# Patient Record
Sex: MALE | Employment: FULL TIME | URBAN - METROPOLITAN AREA
[De-identification: names, ages, dates, MRNs, and addresses within clinical notes are randomized per-mention and may not be internally consistent; named-entity substitution may affect disease eponyms.]

---

## 2018-03-05 ENCOUNTER — HOSPITAL ENCOUNTER (OUTPATIENT)
Dept: LAB | Age: 28
Discharge: HOME | End: 2018-03-05
Payer: COMMERCIAL

## 2018-03-05 ENCOUNTER — OFFICE VISIT (OUTPATIENT)
Dept: HEMATOLOGY | Age: 28
End: 2018-03-05

## 2018-03-05 VITALS
RESPIRATION RATE: 14 BRPM | BODY MASS INDEX: 33.72 KG/M2 | TEMPERATURE: 99.4 F | OXYGEN SATURATION: 98 % | SYSTOLIC BLOOD PRESSURE: 124 MMHG | HEART RATE: 77 BPM | WEIGHT: 249 LBS | DIASTOLIC BLOOD PRESSURE: 88 MMHG | HEIGHT: 72 IN

## 2018-03-05 DIAGNOSIS — R73.03 PREDIABETES: ICD-10-CM

## 2018-03-05 DIAGNOSIS — R74.8 ELEVATED LIVER ENZYMES: ICD-10-CM

## 2018-03-05 DIAGNOSIS — R74.8 ELEVATED LIVER ENZYMES: Primary | ICD-10-CM

## 2018-03-05 PROBLEM — K76.0 FATTY LIVER: Status: ACTIVE | Noted: 2018-03-05

## 2018-03-05 LAB
ALBUMIN SERPL-MCNC: 4.1 G/DL (ref 3.4–5)
ALBUMIN/GLOB SERPL: 1.2 {RATIO} (ref 0.8–1.7)
ALP SERPL-CCNC: 47 U/L (ref 45–117)
ALT SERPL-CCNC: 47 U/L (ref 16–61)
ANION GAP SERPL CALC-SCNC: 8 MMOL/L (ref 3–18)
AST SERPL-CCNC: 104 U/L (ref 15–37)
BASOPHILS # BLD: 0 K/UL (ref 0–0.06)
BASOPHILS NFR BLD: 0 % (ref 0–2)
BILIRUB DIRECT SERPL-MCNC: 0.1 MG/DL (ref 0–0.2)
BILIRUB SERPL-MCNC: 0.6 MG/DL (ref 0.2–1)
BUN SERPL-MCNC: 9 MG/DL (ref 7–18)
BUN/CREAT SERPL: 10 (ref 12–20)
CALCIUM SERPL-MCNC: 8.9 MG/DL (ref 8.5–10.1)
CHLORIDE SERPL-SCNC: 106 MMOL/L (ref 100–108)
CHOLEST SERPL-MCNC: 159 MG/DL
CO2 SERPL-SCNC: 29 MMOL/L (ref 21–32)
CREAT SERPL-MCNC: 0.92 MG/DL (ref 0.6–1.3)
DIFFERENTIAL METHOD BLD: NORMAL
EOSINOPHIL # BLD: 0.1 K/UL (ref 0–0.4)
EOSINOPHIL NFR BLD: 2 % (ref 0–5)
ERYTHROCYTE [DISTWIDTH] IN BLOOD BY AUTOMATED COUNT: 13.4 % (ref 11.6–14.5)
EST. AVERAGE GLUCOSE BLD GHB EST-MCNC: 126 MG/DL
FERRITIN SERPL-MCNC: 129 NG/ML (ref 8–388)
GLOBULIN SER CALC-MCNC: 3.4 G/DL (ref 2–4)
GLUCOSE SERPL-MCNC: 92 MG/DL (ref 74–99)
HBA1C MFR BLD: 6 % (ref 4.5–5.6)
HCT VFR BLD AUTO: 43.3 % (ref 36–48)
HDLC SERPL-MCNC: 49 MG/DL (ref 40–60)
HDLC SERPL: 3.2 {RATIO} (ref 0–5)
HGB BLD-MCNC: 14.6 G/DL (ref 13–16)
INR PPP: 1 (ref 0.8–1.2)
IRON SATN MFR SERPL: 23 %
IRON SERPL-MCNC: 75 UG/DL (ref 50–175)
LDLC SERPL CALC-MCNC: 95.4 MG/DL (ref 0–100)
LIPID PROFILE,FLP: NORMAL
LYMPHOCYTES # BLD: 2.1 K/UL (ref 0.9–3.6)
LYMPHOCYTES NFR BLD: 27 % (ref 21–52)
MCH RBC QN AUTO: 27.4 PG (ref 24–34)
MCHC RBC AUTO-ENTMCNC: 33.7 G/DL (ref 31–37)
MCV RBC AUTO: 81.4 FL (ref 74–97)
MONOCYTES # BLD: 0.8 K/UL (ref 0.05–1.2)
MONOCYTES NFR BLD: 10 % (ref 3–10)
NEUTS SEG # BLD: 4.8 K/UL (ref 1.8–8)
NEUTS SEG NFR BLD: 61 % (ref 40–73)
PLATELET # BLD AUTO: 291 K/UL (ref 135–420)
PMV BLD AUTO: 11.8 FL (ref 9.2–11.8)
POTASSIUM SERPL-SCNC: 4.3 MMOL/L (ref 3.5–5.5)
PROT SERPL-MCNC: 7.5 G/DL (ref 6.4–8.2)
PROTHROMBIN TIME: 12.4 SEC (ref 11.5–15.2)
RBC # BLD AUTO: 5.32 M/UL (ref 4.7–5.5)
SODIUM SERPL-SCNC: 143 MMOL/L (ref 136–145)
TIBC SERPL-MCNC: 322 UG/DL (ref 250–450)
TRIGL SERPL-MCNC: 73 MG/DL (ref ?–150)
VLDLC SERPL CALC-MCNC: 14.6 MG/DL
WBC # BLD AUTO: 7.9 K/UL (ref 4.6–13.2)

## 2018-03-05 PROCEDURE — 87340 HEPATITIS B SURFACE AG IA: CPT | Performed by: INTERNAL MEDICINE

## 2018-03-05 PROCEDURE — 86803 HEPATITIS C AB TEST: CPT | Performed by: INTERNAL MEDICINE

## 2018-03-05 PROCEDURE — 86708 HEPATITIS A ANTIBODY: CPT | Performed by: INTERNAL MEDICINE

## 2018-03-05 PROCEDURE — 86704 HEP B CORE ANTIBODY TOTAL: CPT | Performed by: INTERNAL MEDICINE

## 2018-03-05 PROCEDURE — 82390 ASSAY OF CERULOPLASMIN: CPT | Performed by: INTERNAL MEDICINE

## 2018-03-05 PROCEDURE — 36415 COLL VENOUS BLD VENIPUNCTURE: CPT | Performed by: INTERNAL MEDICINE

## 2018-03-05 PROCEDURE — 85025 COMPLETE CBC W/AUTO DIFF WBC: CPT | Performed by: INTERNAL MEDICINE

## 2018-03-05 PROCEDURE — 83036 HEMOGLOBIN GLYCOSYLATED A1C: CPT | Performed by: INTERNAL MEDICINE

## 2018-03-05 PROCEDURE — 83540 ASSAY OF IRON: CPT | Performed by: INTERNAL MEDICINE

## 2018-03-05 PROCEDURE — 83516 IMMUNOASSAY NONANTIBODY: CPT | Performed by: INTERNAL MEDICINE

## 2018-03-05 PROCEDURE — 80048 BASIC METABOLIC PNL TOTAL CA: CPT | Performed by: INTERNAL MEDICINE

## 2018-03-05 PROCEDURE — 86038 ANTINUCLEAR ANTIBODIES: CPT | Performed by: INTERNAL MEDICINE

## 2018-03-05 PROCEDURE — 82728 ASSAY OF FERRITIN: CPT | Performed by: INTERNAL MEDICINE

## 2018-03-05 PROCEDURE — 82103 ALPHA-1-ANTITRYPSIN TOTAL: CPT | Performed by: INTERNAL MEDICINE

## 2018-03-05 PROCEDURE — 86706 HEP B SURFACE ANTIBODY: CPT | Performed by: INTERNAL MEDICINE

## 2018-03-05 PROCEDURE — 80076 HEPATIC FUNCTION PANEL: CPT | Performed by: INTERNAL MEDICINE

## 2018-03-05 PROCEDURE — 80061 LIPID PANEL: CPT | Performed by: INTERNAL MEDICINE

## 2018-03-05 PROCEDURE — 85610 PROTHROMBIN TIME: CPT | Performed by: INTERNAL MEDICINE

## 2018-03-05 NOTE — MR AVS SNAPSHOT
Radames Macias 
 
 
 [place] [address] 
[phone] Patient: Rachel Harris MRN: [id number] IND:9/[id number] Visit Information Date & Time Provider Department Dept. Phone Encounter #  
 3/5/2018  9:30 AM MD Travis Barclay[address]  [address] [id number] Follow-up Instructions Return in about 4 weeks (around 4/2/2018) for Erica. Upcoming Health Maintenance Date Due DTaP/Tdap/Td series (1 - Tdap) 9/10/2011 Influenza Age 5 to Adult 8/1/2017 Allergies as of 3/5/2018  Review Complete On: 3/5/2018 By: Rosy Colorado MD  
 No Known Allergies Current Immunizations  Never Reviewed No immunizations on file. Not reviewed this visit You Were Diagnosed With   
  
 Codes Comments Elevated liver enzymes    -  Primary ICD-10-CM: R74.8 ICD-9-CM: 790.5 Vitals BP Pulse Temp Resp Height(growth percentile) 124/88 (BP 1 Location: Left arm, BP Patient Position: Sitting) 77 99.4 °F (37.4 °C) (Tympanic) 14 6' (1.829 m) Weight(growth percentile) SpO2 BMI Smoking Status 249 lb (112.9 kg) 98% 33.77 kg/m2 Never Smoker BMI and BSA Data Body Mass Index Body Surface Area  
 33.77 kg/m 2 2.39 m 2 Your Updated Medication List  
  
Notice  As of 3/5/2018 10:12 AM  
 You have not been prescribed any medications. Follow-up Instructions Return in about 4 weeks (around 4/2/2018) for Erica. To-Do List   
 03/05/2018 Lab:  ACTIN (SMOOTH MUSCLE) ANTIBODY   
  
 03/05/2018 Lab:  ALPHA-1-ANTITRYPSIN, TOTAL   
  
 03/05/2018 Lab:  ANTINUCLEAR ANTIBODIES, IFA   
  
 03/05/2018 Lab:  CBC WITH AUTOMATED DIFF   
  
 03/05/2018 Lab:  CERULOPLASMIN   
  
 03/05/2018 Lab:  FERRITIN   
  
 03/05/2018 Lab:  HCV AB W/REFLEX VERIFICATION   
  
 03/05/2018 Lab:  HEMOGLOBIN A1C WITH EAG   
  
 03/05/2018 Lab:  HEP A AB, TOTAL   
  
 03/05/2018 Lab:  HEP B SURFACE AB   
  
 03/05/2018 Lab:  HEP B SURFACE AG   
  
 03/05/2018 Lab:  HEPATIC FUNCTION PANEL   
  
 03/05/2018 Lab:  HEPATITIS B CORE AB, TOTAL   
  
 03/05/2018 Lab:  IRON PROFILE   
  
 03/05/2018 Lab:  LIPID PANEL   
  
 03/05/2018 Lab:  METABOLIC PANEL, BASIC   
  
 03/05/2018 Lab:  PROTHROMBIN TIME + INR   
  
 03/05/2018 Imaging:  US ABD LTD W ELASTOGRAPHY Introducing [facility] & HEALTH SERVICES! Kristy San introduces [employer] patient portal. Now you can access parts of your medical record, email your doctor's office, and request medication refills online. 1. In your internet browser, go to [email or web address] 2. Click on the First Time User? Click Here link in the Sign In box. You will see the New Member Sign Up page. 3. Enter your [employer] Access Code exactly as it appears below. You will not need to use this code after youve completed the sign-up process. If you do not sign up before the expiration date, you must request a new code. · [employer] Access Code: [email or web address] Expires: 6/3/2018 10:12 AM 
 
4. Enter the last four digits of your Social Security Number (xxxx) and Date of Birth (mm/dd/yyyy) as indicated and click Submit. You will be taken to the next sign-up page. 5. Create a [employer] ID. This will be your [employer] login ID and cannot be changed, so think of one that is secure and easy to remember. 6. Create a [employer] password. You can change your password at any time. 7. Enter your Password Reset Question and Answer. This can be used at a later time if you forget your password. 8. Enter your e-mail address. You will receive e-mail notification when new information is available in [address] 9. Click Sign Up. You can now view and download portions of your medical record. 10. Click the Download Summary menu link to download a portable copy of your medical information. If you have questions, please visit the Frequently Asked Questions section of the [employer] website. Remember, Docin is NOT to be used for urgent needs. For medical emergencies, dial 911. Now available from your iPhone and Android! Please provide this summary of care documentation to your next provider. Your primary care clinician is listed as Bora Mayo. If you have any questions after today's visit, please call [phone].

## 2018-03-05 NOTE — PROGRESS NOTES
70 Thelma Bradley MD, [address], [place], [place]       CATIA Negrete PA-C Laymond Lurie, [facility]   CATIA Lopez NP [address] [facility] 136    at [address], [address] [place]  22.    [phone]    FAX: [address] [place] [address]    at [address], [address] [facility], [address] [phone]    FAX: [phone]         Patient Care Team:  Lisa Singh MD as PCP - General (Family Practice)      Problem List  Date Reviewed: 3/5/2018          Codes Class Noted    Fatty liver ICD-10-CM: K76.0  ICD-9-CM: 571.8  3/5/2018        Prediabetes ICD-10-CM: R73.03  ICD-9-CM: 790.29  3/5/2018    Overview Signed 3/5/2018 11:12 AM by Justin Núñez MD     HBA1C 6%                     The physicians listed above have asked me to see Katelin Andrew in consultation regarding elevated liver enzymes and its management. All medical records sent by the referring physicians were reviewed including imaging studies     The patient is a 32 y.o. Black male who was first noted to have abnormalities in liver enzymes in 9/2017. Serologic evaluation for markers of chronic liver disease were either not performed or available to me. Ultrasound of the liver was performed in 10/2017. The results of the imaging suggested fatty liver disease. An assessment of liver fibrosis with biopsy or elastography has not been performed. The patient had not started any new medications within 3 months preceeding the elevation in liver chemistries. The most recent laboratory studies are not available. The patient has no symptoms which could be attributed to the liver disorder.       The patient completes all daily activities without any functional limitations. The patient has not experienced fatigue, pain in the right side over the liver,       ALLERGIES  No Known Allergies    MEDICATIONS  No current outpatient prescriptions on file. No current facility-administered medications for this visit. SYSTEM REVIEW NOT RELATED TO LIVER DISEASE OR REVIEWED ABOVE:  Constitution systems: 44 pound weight loss from diet and exercise. Eyes: Negative for visual changes. ENT: Negative for sore throat, painful swallowing. Respiratory: Negative for cough, hemoptysis, SOB. Cardiology: Negative for chest pain, palpitations. GI:  Negative for constipation or diarrhea. : Negative for urinary frequency, dysuria, hematuria, nocturia. Skin: Negative for rash. Hematology: Negative for easy bruising, blood clots. Musculo-skelatal: Negative for back pain, muscle pain, weakness. Neurologic: Negative for headaches, dizziness, vertigo, memory problems not related to HE. Psychology: Negative for anxiety, depression. FAMILY HISTORY:  The father is alive and healthy. The mother has the following chronic diseases: HTN, DM. There is no family history of liver disease. SOCIAL HISTORY:  The patient has never been . The patient has no children. The patient has never used tobacco products. The patient has never consumed significant amounts of alcohol. 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 PHYSICAL EXAMINATION:  Visit Vitals    /88 (BP 1 Location: Left arm, BP Patient Position: Sitting)    Pulse 77    Temp 99.4 °F (37.4 °C) (Tympanic)    Resp 14    Ht 6' (1.829 m)    Wt 249 lb (112.9 kg)    SpO2 98%    BMI 33.77 kg/m2     General: No acute distress. Eyes: Sclera anicteric. ENT: No oral lesions. Thyroid normal.  Nodes: No adenopathy. Skin: No spider angiomata. No jaundice. No palmar erythema.   Respiratory: Lungs clear to auscultation. Cardiovascular: Regular heart rate. No murmurs. No JVD. Abdomen: Soft non-tender. Liver size normal to percussion/palpation. Spleen not palpable. No obvious ascites. Extremities: No edema. No muscle wasting. No gross arthritic changes. Neurologic: Alert and oriented. Cranial nerves grossly intact. No asterixis. LABORATORY STUDIES:  Recent liver function panel, CBC with platelet count and BMP are not available. These studies will be performed. SEROLOGIES:  Not available or performed. Testing was performed today. LIVER HISTOLOGY:  Not available or performed    ENDOSCOPIC PROCEDURES:  Not available or performed    RADIOLOGY:  10/2017. Ultrasound of liver. Echogenic consistent with fatty liver. No liver mass lesions. No dilated bile ducts. No ascites. OTHER TESTING:  Not available or performed    ASSESSMENT AND PLAN:  Elevation in liver transaminases of unclear etiology at this time. Will perform laboratory testing to monitor liver function and degree of liver injury. This included BMP, hepatic panel, CBC with platelet count, INR. Serologic testing to help define the cause of the laboratory abnormality were ordered. The need to assess liver fibrosis was discussed. Sheer wave elastography can assess liver fibrosis and determine if a patient has advanced fibrosis or cirrhosis without the need for liver biopsy. Sheer wave elastography is now available at [address]. This will be scheduled. If elastrography suggests advanced fibrosis then a liver biopsy should be considered. The patient has no overt features of metabolic syndrome and may therefore not have fatty liver. If he does it may be benign NAFL. The patient has galo on a reduced charb diet and working out more. He has lost 44 pounds in the past few months. The patient was directed to continue all current medications at the current dosages.   There are no contraindications for the patient to take any medications that are necessary for treatment of other medical issues. The patient was counseled regarding alcohol consumption. The need for vaccination against viral hepatitis A and B will be assessed with serologic and instituted as appropriate. All of the above issues were discussed with the patient. All questions were answered. The patient expressed a clear understanding of the above. [facility] [place] [address] in 4 weeks to review all data and determine the treatment plan.     Deion Whitney MD  Liver [place] of [address] [place] [address], [address] [place] [address], [facility] [address]  [phone]

## 2018-03-06 LAB
A1AT SERPL-MCNC: 97 MG/DL (ref 90–200)
ACTIN IGG SERPL-ACNC: 16 UNITS (ref 0–19)
ANA TITR SER IF: NEGATIVE {TITER}
CERULOPLASMIN SERPL-MCNC: 21.7 MG/DL (ref 16–31)
COMMENT, 144067: NORMAL
HAV AB SER QL IA: POSITIVE
HBV CORE AB SERPL QL IA: NEGATIVE
HBV SURFACE AB SER QL IA: POSITIVE
HBV SURFACE AB SERPL IA-ACNC: >1000 MIU/ML
HBV SURFACE AG SER QL: <0.1 INDEX
HBV SURFACE AG SER QL: NEGATIVE
HCV AB S/CO SERPL IA: <0.1 S/CO RATIO (ref 0–0.9)
HEP BS AB COMMENT,HBSAC: NORMAL

## 2018-03-14 ENCOUNTER — HOSPITAL ENCOUNTER (OUTPATIENT)
Dept: ULTRASOUND IMAGING | Age: 28
Discharge: HOME | End: 2018-03-14
Attending: INTERNAL MEDICINE
Payer: COMMERCIAL

## 2018-03-14 DIAGNOSIS — R74.8 ELEVATED LIVER ENZYMES: ICD-10-CM

## 2018-03-14 PROCEDURE — 0346T US ABD LTD W ELASTOGRAPHY: CPT

## 2018-04-09 ENCOUNTER — OFFICE VISIT (OUTPATIENT)
Dept: HEMATOLOGY | Age: 28
End: 2018-04-09

## 2018-04-09 VITALS
RESPIRATION RATE: 18 BRPM | DIASTOLIC BLOOD PRESSURE: 91 MMHG | WEIGHT: 250 LBS | OXYGEN SATURATION: 95 % | HEIGHT: 72 IN | BODY MASS INDEX: 33.86 KG/M2 | SYSTOLIC BLOOD PRESSURE: 144 MMHG | HEART RATE: 100 BPM | TEMPERATURE: 100.1 F

## 2018-04-09 DIAGNOSIS — K76.0 FATTY LIVER: Primary | ICD-10-CM

## 2018-04-09 NOTE — MR AVS SNAPSHOT
[address] [place] [address] 
[phone] Patient: Rai Hu MRN: [id number] ENN:7/[birth year] Visit Information Date & Time Provider Department Dept. Phone Encounter #  
 4/9/2018  3:30 PM CATIA Stormtuan [address] of  [address] [id number] Follow-up Instructions Return in about 6 months (around 10/9/2018) for Erica . Upcoming Health Maintenance Date Due DTaP/Tdap/Td series (1 - Tdap) 9/10/2011 Influenza Age 5 to Adult 8/1/2017 Allergies as of 4/9/2018  Review Complete On: 4/9/2018 By: Gokul Prado No Known Allergies Current Immunizations  Never Reviewed No immunizations on file. Not reviewed this visit Vitals BP Pulse Temp Resp Height(growth percentile) (!) 144/91 (BP 1 Location: Right arm, BP Patient Position: Sitting) 100 100.1 °F (37.8 °C) (Tympanic) 18 6' (1.829 m) Weight(growth percentile) SpO2 BMI Smoking Status 250 lb (113.4 kg) 95% 33.91 kg/m2 Never Smoker BMI and BSA Data Body Mass Index Body Surface Area  
 33.91 kg/m 2 2.4 m 2 Your Updated Medication List  
  
Notice  As of 4/9/2018  3:52 PM  
 You have not been prescribed any medications. Follow-up Instructions Return in about 6 months (around 10/9/2018) for Erica . Introducing [facility] & HEALTH SERVICES! Dear Marcus Lowry: Thank you for requesting a [employer] account. Our records indicate that you already have an active [employer]keysee account. You can access your account anytime at [email or web address] Did you know that you can access your hospital and ER discharge instructions at any time in [employer]see? You can also review all of your test results from your hospital stay or ER visit. Additional Information If you have questions, please visit the Frequently Asked Questions section of the [employer] website at [email or web address]. [email or web address] Remember, [employer] is NOT to be used for urgent needs. For medical emergencies, dial 911. Now available from your iPhone and Android! Please provide this summary of care documentation to your next provider. Your primary care clinician is listed as Patrizia Suh. If you have any questions after today's visit, please call [phone].

## 2018-04-09 NOTE — PROGRESS NOTES
Janis Alejo is a 32 y.o. male    No chief complaint on file. 1. Have you been to the ER, urgent care clinic or hospitalized since your last visit? NO.     2. Have you seen or consulted any other health care providers outside of the [facility] since your last visit (Include any pap smears or colon screening)?  NO    Learning Assessment 3/5/2018   PRIMARY LEARNER Patient   HIGHEST LEVEL OF EDUCATION - PRIMARY LEARNER  > 4 YEARS OF COLLEGE   BARRIERS PRIMARY LEARNER NONE   CO-LEARNER CAREGIVER No   PRIMARY LANGUAGE ENGLISH   LEARNER PREFERENCE PRIMARY LISTENING   ANSWERED BY patient   RELATIONSHIP SELF

## 2018-04-09 NOTE — PROGRESS NOTES
70 Thelma Bradley MD, [address], [facility] [place]       Ayush Gonzales, RAPHAEL Mcdowell, [facility]-BC   Lacinda Boast, NP Dickson Dan, NP [address] [facility] [facility] 136    at [address], [address] [place] [address]  22.    [phone]    FAX: [address] [place] [address]    at [facility], [id number] [place] [address], [address]    [phone]    FAX: [phone]       Patient Care Team:  Horacio Ervin MD as PCP - General (Family Practice)      Problem List  Date Reviewed: 3/5/2018          Codes Class Noted    Fatty liver ICD-10-CM: K76.0  ICD-9-CM: 571.8  3/5/2018        Prediabetes ICD-10-CM: R73.03  ICD-9-CM: 790.29  3/5/2018    Overview Signed 3/5/2018 11:12 AM by Blayne Riley MD     HBA1C 6%                   [facility] Pale [facility] for management of presumed non-alcoholic fatty liver (NAFL). The active problem list, all pertinent past medical history, medications, liver histology, radiologic findings, and laboratory findings related to the liver disorder were reviewed with the patient. The patient is a 32 y.o. Black male who was first noted to have abnormalities in liver enzymes in 9/2017. Serologic evaluation for markers of chronic liver disease were negative. Ultrasound of the liver was performed in 10/2017. The results of the imaging suggested fatty liver disease. Assessment of liver fibrosis was performed with sheer wave elastography at [facility] in 3/2018. The result was 3.6 kPa which correlates with no fibrosis. The patient had not started any new medications within 3 months preceeding the elevation in liver chemistries.     The most recent laboratory studies indicate that the AST is elevated, ALT is normal, alkaline phosphatase is normal, tests of hepatic synthetic and metabolic function are normal, and the platelet count is normal.      The patient has no symptoms which could be attributed to the liver disorder. The patient completes all daily activities without any functional limitations. The patient has not experienced fatigue, pain in the right side over the liver. ALLERGIES  No Known Allergies    MEDICATIONS  No current outpatient prescriptions on file. No current facility-administered medications for this visit. SYSTEM REVIEW NOT RELATED TO LIVER DISEASE OR REVIEWED ABOVE:  Constitution systems: 20 pound weight loss from diet and exercise. Eyes: Negative for visual changes. ENT: Negative for sore throat, painful swallowing. Respiratory: Negative for cough, hemoptysis, SOB. Cardiology: Negative for chest pain, palpitations. GI:  Negative for constipation or diarrhea. : Negative for urinary frequency, dysuria, hematuria, nocturia. Skin: Negative for rash. Hematology: Negative for easy bruising, blood clots. Musculo-skelatal: Negative for back pain, muscle pain, weakness. Neurologic: Negative for headaches, dizziness, vertigo, memory problems not related to HE. Psychology: Negative for anxiety, depression. FAMILY HISTORY:  The father is alive and healthy. The mother has the following chronic diseases: HTN, DM. There is no family history of liver disease. SOCIAL HISTORY:  The patient has never been . The patient has no children. The patient has never used tobacco products. The patient has never consumed significant amounts of alcohol. 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     PHYSICAL EXAMINATION:  Visit Vitals    BP (!) 144/91 (BP 1 Location: Right arm, BP Patient Position: Sitting)    Pulse 100    Temp 100.1 °F (37.8 °C) (Tympanic)    Resp 18    Ht 6' (1.829 m)    Wt 250 lb (113.4 kg)    SpO2 95%    BMI 33.91 kg/m2     General: No acute distress. Eyes: Sclera anicteric. ENT: No oral lesions. Thyroid normal.  Nodes: No adenopathy. Skin: No spider angiomata. No jaundice. No palmar erythema. Respiratory: Lungs clear to auscultation. Cardiovascular: Regular heart rate. No murmurs. No JVD. Abdomen: Soft non-tender. Liver size normal to percussion/palpation. Spleen not palpable. No obvious ascites. Extremities: No edema. No muscle wasting. No gross arthritic changes. Neurologic: Alert and oriented. Cranial nerves grossly intact. No asterixis. LABORATORY STUDIES:  Liver [place] of [address] [place] [address] Units 3/5/2018   WBC 4.6 - 13.2 K/uL 7.9   ANC 1.8 - 8.0 K/UL 4.8   HGB 13.0 - 16.0 g/dL 14.6    - 420 K/uL 291   INR 0.8 - 1.2   1.0   AST 15 - 37 U/L 104 (H)   ALT 16 - 61 U/L 47   Alk Phos 45 - 117 U/L 47   Bili, Total 0.2 - 1.0 MG/DL 0.6   Bili, Direct 0.0 - 0.2 MG/DL 0.1   Albumin 3.4 - 5.0 g/dL 4.1   BUN 7.0 - 18 MG/DL 9   Creat 0.6 - 1.3 MG/DL 0.92   Na 136 - 145 mmol/L 143   K 3.5 - 5.5 mmol/L 4.3   Cl 100 - 108 mmol/L 106   CO2 21 - 32 mmol/L 29   Glucose 74 - 99 mg/dL 92     SEROLOGIES:  Serologies Latest Ref Rng & Units 3/5/2018   Hep A Ab, Total NEGATIVE   Positive (A)   Hep B Surface Ag <1.00 Index <0.10   Hep B Surface Ag Interp NEG   NEGATIVE   Hep B Core Ab, Total NEGATIVE   NEGATIVE   Hep B Surface Ab >10.0 mIU/mL >1000.00   Hep B Surface Ab Interp POS   POSITIVE   Hep C Ab 0.0 - 0.9 s/co ratio <0.1   Ferritin 8 - 388 NG/   Iron % Saturation % 23   DEJAN, IFA  NEGATIVE   ASMCA 0 - 19 Units 16   Ceruloplasmin 16.0 - 31.0 mg/dL 21.7   Alpha-1 antitrypsin level 90 - 200 mg/dL 97     LIVER HISTOLOGY:  3/2018. Sheer wave elastography performed at THE [facility]. EkPa was E Range: 2.2-4.6 kPa, E Mean: 3.6 kPa, E Median: 3.6 kPa, E Std: 0.7 kPa. The results suggested a fibrosis level of F0.     ENDOSCOPIC PROCEDURES:  Not available or performed    RADIOLOGY:  10/2017. Ultrasound of liver. Echogenic consistent with fatty liver. No liver mass lesions. No dilated bile ducts. No ascites. 3/2018. Ultrasound of liver. Echogenic consistent with fatty liver. No liver mass lesions. No dilated bile ducts. No ascites. OTHER TESTING:  Not available or performed    ASSESSMENT AND PLAN:  Elevation in liver transaminases of unclear etiology at this time. Suspected fatty liver disease with no fibrosis. AST is elevated, ALT is normal, Alkaline phosphate is normal. Liver function is normal. The platelet count is normal.     Serologic testing to help define the cause of the laboratory abnormality were negative. Based upon laboratory studies and imaging the patient does not appear to have advanced liver disease. The benign form of fatty liver disease, NAFL (nonalcoholic fatty liver), is not thought to progress to fibrosis or cirrhosis. If the patient loses weight all steatosis will resolve. However, if the patient gains weight he could evolve into HAHN, develop liver injury and be at risk to develop cirrhosis. The patient has no overt features of metabolic syndrome and may therefore not have fatty liver. If he does it may be benign NAFL. The patient has been on a reduced carb diet and working out more. He has lost 20 pounds. We will continue to monitor the patient at periodic intervals. If weight loss is successful the fat will resolve from the liver and liver enzymes should return to the normal range. We would then repeat an ultrasound to see if this also returns to normal.  If liver enzymes do not return to normal with weight reduction additional evaluation may be necessary over time. There are no contraindications for the patient to take any medications that are necessary for treatment of other medical issues. Currently the patient is not taking any medications.      The patient was counseled regarding alcohol consumption. Vaccination for viral hepatitis A and B is not required. The patient has serologic evidence of prior exposure or vaccination with immunity. All of the above issues were discussed with the patient. All questions were answered. The patient expressed a clear understanding of the above. [address] [place] [address] in 6 months to review all data and determine the treatment plan.     CATIA Kunz [address] [place] [address] - [address]  [phone]

## 2018-10-10 ENCOUNTER — OFFICE VISIT (OUTPATIENT)
Dept: HEMATOLOGY | Age: 28
End: 2018-10-10

## 2018-10-10 ENCOUNTER — HOSPITAL ENCOUNTER (OUTPATIENT)
Dept: LAB | Age: 28
Discharge: HOME | End: 2018-10-10
Payer: COMMERCIAL

## 2018-10-10 VITALS
OXYGEN SATURATION: 97 % | TEMPERATURE: 98.2 F | RESPIRATION RATE: 18 BRPM | DIASTOLIC BLOOD PRESSURE: 81 MMHG | SYSTOLIC BLOOD PRESSURE: 128 MMHG | HEIGHT: 72 IN | HEART RATE: 79 BPM | WEIGHT: 253 LBS | BODY MASS INDEX: 34.27 KG/M2

## 2018-10-10 DIAGNOSIS — R74.8 ELEVATED LIVER ENZYMES: Primary | ICD-10-CM

## 2018-10-10 DIAGNOSIS — R74.8 ELEVATED LIVER ENZYMES: ICD-10-CM

## 2018-10-10 LAB
ALBUMIN SERPL-MCNC: 4.1 G/DL (ref 3.4–5)
ALBUMIN/GLOB SERPL: 1.2 {RATIO} (ref 0.8–1.7)
ALP SERPL-CCNC: 44 U/L (ref 45–117)
ALT SERPL-CCNC: 58 U/L (ref 16–61)
ANION GAP SERPL CALC-SCNC: 9 MMOL/L (ref 3–18)
AST SERPL-CCNC: 30 U/L (ref 15–37)
BASOPHILS # BLD: 0.1 K/UL (ref 0–0.1)
BASOPHILS NFR BLD: 2 % (ref 0–2)
BILIRUB DIRECT SERPL-MCNC: 0.1 MG/DL (ref 0–0.2)
BILIRUB SERPL-MCNC: 0.5 MG/DL (ref 0.2–1)
BUN SERPL-MCNC: 8 MG/DL (ref 7–18)
BUN/CREAT SERPL: 8 (ref 12–20)
CALCIUM SERPL-MCNC: 9 MG/DL (ref 8.5–10.1)
CHLORIDE SERPL-SCNC: 102 MMOL/L (ref 100–108)
CO2 SERPL-SCNC: 28 MMOL/L (ref 21–32)
CREAT SERPL-MCNC: 0.95 MG/DL (ref 0.6–1.3)
DIFFERENTIAL METHOD BLD: ABNORMAL
EOSINOPHIL # BLD: 0.1 K/UL (ref 0–0.4)
EOSINOPHIL NFR BLD: 2 % (ref 0–5)
ERYTHROCYTE [DISTWIDTH] IN BLOOD BY AUTOMATED COUNT: 13.5 % (ref 11.6–14.5)
GLOBULIN SER CALC-MCNC: 3.4 G/DL (ref 2–4)
GLUCOSE SERPL-MCNC: 82 MG/DL (ref 74–99)
HCT VFR BLD AUTO: 46 % (ref 36–48)
HGB BLD-MCNC: 15.1 G/DL (ref 13–16)
LYMPHOCYTES # BLD: 1.9 K/UL (ref 0.9–3.6)
LYMPHOCYTES NFR BLD: 31 % (ref 21–52)
MCH RBC QN AUTO: 26.7 PG (ref 24–34)
MCHC RBC AUTO-ENTMCNC: 32.8 G/DL (ref 31–37)
MCV RBC AUTO: 81.3 FL (ref 74–97)
MONOCYTES # BLD: 0.5 K/UL (ref 0.05–1.2)
MONOCYTES NFR BLD: 8 % (ref 3–10)
NEUTS SEG # BLD: 3.5 K/UL (ref 1.8–8)
NEUTS SEG NFR BLD: 57 % (ref 40–73)
PLATELET # BLD AUTO: 287 K/UL (ref 135–420)
PMV BLD AUTO: 11.9 FL (ref 9.2–11.8)
POTASSIUM SERPL-SCNC: 4.9 MMOL/L (ref 3.5–5.5)
PROT SERPL-MCNC: 7.5 G/DL (ref 6.4–8.2)
RBC # BLD AUTO: 5.66 M/UL (ref 4.7–5.5)
SODIUM SERPL-SCNC: 139 MMOL/L (ref 136–145)
WBC # BLD AUTO: 6.1 K/UL (ref 4.6–13.2)

## 2018-10-10 PROCEDURE — 80048 BASIC METABOLIC PNL TOTAL CA: CPT | Performed by: NURSE PRACTITIONER

## 2018-10-10 PROCEDURE — 80076 HEPATIC FUNCTION PANEL: CPT | Performed by: NURSE PRACTITIONER

## 2018-10-10 PROCEDURE — 36415 COLL VENOUS BLD VENIPUNCTURE: CPT | Performed by: NURSE PRACTITIONER

## 2018-10-10 PROCEDURE — 85025 COMPLETE CBC W/AUTO DIFF WBC: CPT | Performed by: NURSE PRACTITIONER

## 2018-10-10 NOTE — PROGRESS NOTES
1. Have you been to the ER, urgent care clinic since your last visit? Hospitalized since your last visit? No    2. Have you seen or consulted any other health care providers outside of the [address] [place] [address] since your last visit? Include any pap smears or colon screening.  No

## 2018-10-10 NOTE — PROGRESS NOTES
245 [facility] 2014 Ric Cisse MD, Balaji Tadeo, Lizzie Grady [place], [place]       Faustina Patient, NP    Arely Childress, RAPHAEL Britton, [facility]   Burton Riggs, CATIA Sandhu [facility] 136    at [facility], [address] [address]  [address].    [phone]    FAX: [address] [place] [address], [address] [facility], [address]    [phone]    FAX: [phone]     Patient Care Team:  Lloyd Rosales MD as PCP - General (Family Practice)      Problem List  Date Reviewed: 4/10/2018          Codes Class Noted    Fatty liver ICD-10-CM: K76.0  ICD-9-CM: 571.8  3/5/2018        Prediabetes ICD-10-CM: R73.03  ICD-9-CM: 790.29  3/5/2018    Overview Signed 3/5/2018 11:12 AM by Monalisa Akhtar MD     HBA1C 6%                   Marcia Angeles returns to the [address] for management of presumed non-alcoholic fatty liver (NAFL). The active problem list, all pertinent past medical history, medications, liver histology, radiologic findings, and laboratory findings related to the liver disorder were reviewed with the patient. The patient is a 29 y.o. Black male who was first noted to have abnormalities in liver enzymes in 9/2017. Serologic evaluation for markers of chronic liver disease were negative. Ultrasound of the liver was performed in 10/2017. The results of the imaging suggested fatty liver disease. Assessment of liver fibrosis was performed with sheer wave elastography at THE [facility] in 3/2018. The result was 3.6 kPa which correlates with no fibrosis. The patient had not started any new medications within 3 months preceeding the elevation in liver chemistries.     The most recent laboratory studies indicate that the AST is elevated, ALT is normal, alkaline phosphatase is normal, tests of hepatic synthetic and metabolic function are normal, and the platelet count is normal.      The patient has no symptoms which could be attributed to the liver disorder. The patient has not experienced fatigue, pain in the right side over the liver. The patient completes all daily activities without any functional limitations. ALLERGIES  No Known Allergies    MEDICATIONS  No current outpatient prescriptions on file. No current facility-administered medications for this visit. SYSTEM REVIEW NOT RELATED TO LIVER DISEASE OR REVIEWED ABOVE:  Constitution systems: 20 pound weight loss from diet and exercise. Eyes: Negative for visual changes. ENT: Negative for sore throat, painful swallowing. Respiratory: Negative for cough, hemoptysis, SOB. Cardiology: Negative for chest pain, palpitations. GI:  Negative for constipation or diarrhea. : Negative for urinary frequency, dysuria, hematuria, nocturia. Skin: Negative for rash. Hematology: Negative for easy bruising, blood clots. Musculo-skelatal: Negative for back pain, muscle pain, weakness. Neurologic: Negative for headaches, dizziness, vertigo, memory problems not related to HE. Psychology: Negative for anxiety, depression. FAMILY HISTORY:  The father is alive and healthy. The mother has the following chronic diseases: HTN, DM. There is no family history of liver disease. SOCIAL HISTORY:  The patient has never been . The patient has no children. The patient has never used tobacco products. The patient has never consumed significant amounts of alcohol. The patient currently works full time as a contractor with the [employer]     PHYSICAL EXAMINATION:  Visit Vitals    /81 (BP 1 Location: Left arm, BP Patient Position: Sitting)    Pulse 79    Temp 98.2 °F (36.8 °C) (Tympanic)    Resp 18    Ht 6' (1.829 m)    Wt 253 lb (114.8 kg)    SpO2 97%    BMI 34.31 kg/m2     General: No acute distress. Eyes: Sclera anicteric. ENT: No oral lesions. Thyroid normal.  Nodes: No adenopathy. Skin: No spider angiomata. No jaundice. No palmar erythema. Respiratory: Lungs clear to auscultation. Cardiovascular: Regular heart rate. No murmurs. No JVD. Abdomen: Soft non-tender. Liver size normal to percussion/palpation. Spleen not palpable. No obvious ascites. Extremities: No edema. No muscle wasting. No gross arthritic changes. Neurologic: Alert and oriented. Cranial nerves grossly intact. No asterixis. LABORATORY STUDIES:  Liver [place] of [address] Units 3/5/2018   WBC 4.6 - 13.2 K/uL 7.9   ANC 1.8 - 8.0 K/UL 4.8   HGB 13.0 - 16.0 g/dL 14.6    - 420 K/uL 291   INR 0.8 - 1.2   1.0   AST 15 - 37 U/L 104 (H)   ALT 16 - 61 U/L 47   Alk Phos 45 - 117 U/L 47   Bili, Total 0.2 - 1.0 MG/DL 0.6   Bili, Direct 0.0 - 0.2 MG/DL 0.1   Albumin 3.4 - 5.0 g/dL 4.1   BUN 7.0 - 18 MG/DL 9   Creat 0.6 - 1.3 MG/DL 0.92   Na 136 - 145 mmol/L 143   K 3.5 - 5.5 mmol/L 4.3   Cl 100 - 108 mmol/L 106   CO2 21 - 32 mmol/L 29   Glucose 74 - 99 mg/dL 92     SEROLOGIES:  Serologies Latest Ref Rng & Units 3/5/2018   Hep A Ab, Total NEGATIVE   Positive (A)   Hep B Surface Ag <1.00 Index <0.10   Hep B Surface Ag Interp NEG   NEGATIVE   Hep B Core Ab, Total NEGATIVE   NEGATIVE   Hep B Surface Ab >10.0 mIU/mL >1000.00   Hep B Surface Ab Interp POS   POSITIVE   Hep C Ab 0.0 - 0.9 s/co ratio <0.1   Ferritin 8 - 388 NG/   Iron % Saturation % 23   DEJAN, IFA  NEGATIVE   ASMCA 0 - 19 Units 16   Ceruloplasmin 16.0 - 31.0 mg/dL 21.7   Alpha-1 antitrypsin level 90 - 200 mg/dL 97     LIVER HISTOLOGY:  3/2018. Sheer wave elastography performed at THE [facility]. EkPa was E Range: 2.2-4.6 kPa, E Mean: 3.6 kPa, E Median: 3.6 kPa, E Std: 0.7 kPa. The results suggested a fibrosis level of F0.     ENDOSCOPIC PROCEDURES:  Not available or performed    RADIOLOGY:  10/2017. Ultrasound of liver. Echogenic consistent with fatty liver. No liver mass lesions. No dilated bile ducts. No ascites. 3/2018. Ultrasound of liver. Echogenic consistent with fatty liver. No liver mass lesions. No dilated bile ducts. No ascites. OTHER TESTING:  Not available or performed    ASSESSMENT AND PLAN:  Elevation in liver transaminases of unclear etiology at this time. Suspected fatty liver disease with no fibrosis. AST is elevated, ALT is normal, Alkaline phosphate is normal. Liver function is normal. The platelet count is normal.     Serologic testing to help define the cause of the laboratory abnormality were negative. Based upon laboratory studies and imaging the patient does not appear to have advanced liver disease. The benign form of fatty liver disease, NAFL (nonalcoholic fatty liver), is not thought to progress to fibrosis or cirrhosis. If the patient loses weight all steatosis will resolve. However, if the patient gains weight he could evolve into HAHN, develop liver injury and be at risk to develop cirrhosis. The patient has no overt features of metabolic syndrome and may therefore not have fatty liver. If he does it may be benign NAFL. We will continue to monitor the patient at periodic intervals. If weight loss is successful the fat will resolve from the liver and liver enzymes should return to the normal range. We would then repeat an ultrasound to see if this also returns to normal.  If liver enzymes do not return to normal with weight reduction additional evaluation may be necessary over time. There are no contraindications for the patient to take any medications that are necessary for treatment of other medical issues. Currently the patient is not taking any medications. The patient was counseled regarding alcohol consumption. Vaccination for viral hepatitis A and B is not required.   The patient has serologic evidence of prior exposure or vaccination with immunity. All of the above issues were discussed with the patient. All questions were answered. The patient expressed a clear understanding of the above. [facility] 87 in 6 months for routine monitoring and elastography.        VANESSA Lemus-ELAINE Sommer 13 [facility]  [address] [facility] [address], 8303 [facility]  98 [facility], [address]  [phone]  [address]

## 2018-10-10 NOTE — MR AVS SNAPSHOT
[place] [address] 
[phone] Patient: Bharat Valladares MRN: [id number] DMX:6/09/2021 Visit Information Date & Time Provider Department Dept. Phone Encounter #  
 10/10/2018  8:30 AM [facility]  [address] [id number] Follow-up Instructions Return in about 6 months (around 4/10/2019) for NAFL. Upcoming Health Maintenance Date Due DTaP/Tdap/Td series (1 - Tdap) 9/10/2011 Influenza Age 5 to Adult 8/1/2018 Allergies as of 10/10/2018  Review Complete On: 10/10/2018 By: Chai Mosher NP No Known Allergies Current Immunizations  Never Reviewed No immunizations on file. Not reviewed this visit You Were Diagnosed With   
  
 Codes Comments Elevated liver enzymes    -  Primary ICD-10-CM: R74.8 ICD-9-CM: 790.5 Vitals BP Pulse Temp Resp Height(growth percentile) 128/81 (BP 1 Location: Left arm, BP Patient Position: Sitting) 79 98.2 °F (36.8 °C) (Tympanic) 18 6' (1.829 m) Weight(growth percentile) SpO2 BMI Smoking Status 253 lb (114.8 kg) 97% 34.31 kg/m2 Never Smoker Vitals History BMI and BSA Data Body Mass Index Body Surface Area  
 34.31 kg/m 2 2.41 m 2 Your Updated Medication List  
  
Notice  As of 10/10/2018  8:47 AM  
 You have not been prescribed any medications. Follow-up Instructions Return in about 6 months (around 4/10/2019) for NAFL. To-Do List   
 10/10/2018 Lab:  CBC WITH AUTOMATED DIFF   
  
 10/10/2018 Lab:  HEPATIC FUNCTION PANEL   
  
 10/10/2018 Lab:  METABOLIC PANEL, BASIC   
  
 04/08/2019 Imaging:  US ABD LTD [facility] ELASTOGRAP[facility] [place] [facility]! Dear Ira Friedman: Thank you for requesting a [employer] account. Our records indicate that you already have an active [employer] account.   You can access your account anytime at [email or web address] Did you know that you can access your hospital and ER discharge instructions at any time in [facility]? You can also review all of your test results from your hospital stay or ER visit. Additional Information If you have questions, please visit the Frequently Asked Questions section of the [employer] website at [email or web address] Remember, [facility] is NOT to be used for urgent needs. For medical emergencies, dial 911. Now available from your iPhone and Android! Please provide this summary of care documentation to your next provider. Your primary care clinician is listed as Imkki Grand. If you have any questions after today's visit, please call [phone].

## 2019-04-04 ENCOUNTER — HOSPITAL ENCOUNTER (OUTPATIENT)
Dept: ULTRASOUND IMAGING | Age: 29
Discharge: HOME | End: 2019-04-04
Attending: NURSE PRACTITIONER
Payer: COMMERCIAL

## 2019-04-04 DIAGNOSIS — R74.8 ELEVATED LIVER ENZYMES: ICD-10-CM

## 2019-04-04 PROCEDURE — 76981 USE PARENCHYMA: CPT

## 2019-04-18 ENCOUNTER — OFFICE VISIT (OUTPATIENT)
Dept: HEMATOLOGY | Age: 29
End: 2019-04-18

## 2019-04-18 ENCOUNTER — HOSPITAL ENCOUNTER (OUTPATIENT)
Dept: LAB | Age: 29
Discharge: HOME | End: 2019-04-18
Payer: COMMERCIAL

## 2019-04-18 VITALS
HEART RATE: 106 BPM | BODY MASS INDEX: 34.4 KG/M2 | WEIGHT: 254 LBS | OXYGEN SATURATION: 97 % | DIASTOLIC BLOOD PRESSURE: 80 MMHG | TEMPERATURE: 98.5 F | HEIGHT: 72 IN | SYSTOLIC BLOOD PRESSURE: 121 MMHG

## 2019-04-18 DIAGNOSIS — R73.03 PREDIABETES: ICD-10-CM

## 2019-04-18 DIAGNOSIS — K76.0 FATTY LIVER: Primary | ICD-10-CM

## 2019-04-18 DIAGNOSIS — K76.0 FATTY LIVER: ICD-10-CM

## 2019-04-18 LAB
ALBUMIN SERPL-MCNC: 4.1 G/DL (ref 3.4–5)
ALBUMIN/GLOB SERPL: 1.2 {RATIO} (ref 0.8–1.7)
ALP SERPL-CCNC: 57 U/L (ref 45–117)
ALT SERPL-CCNC: 46 U/L (ref 16–61)
ANION GAP SERPL CALC-SCNC: 5 MMOL/L (ref 3–18)
AST SERPL-CCNC: 32 U/L (ref 15–37)
BASOPHILS # BLD: 0.1 K/UL (ref 0–0.1)
BASOPHILS NFR BLD: 1 % (ref 0–2)
BILIRUB DIRECT SERPL-MCNC: 0.2 MG/DL (ref 0–0.2)
BILIRUB SERPL-MCNC: 0.5 MG/DL (ref 0.2–1)
BUN SERPL-MCNC: 12 MG/DL (ref 7–18)
BUN/CREAT SERPL: 11 (ref 12–20)
CALCIUM SERPL-MCNC: 8.8 MG/DL (ref 8.5–10.1)
CHLORIDE SERPL-SCNC: 104 MMOL/L (ref 100–108)
CO2 SERPL-SCNC: 32 MMOL/L (ref 21–32)
CREAT SERPL-MCNC: 1.07 MG/DL (ref 0.6–1.3)
DIFFERENTIAL METHOD BLD: ABNORMAL
EOSINOPHIL # BLD: 0.5 K/UL (ref 0–0.4)
EOSINOPHIL NFR BLD: 6 % (ref 0–5)
ERYTHROCYTE [DISTWIDTH] IN BLOOD BY AUTOMATED COUNT: 13.6 % (ref 11.6–14.5)
EST. AVERAGE GLUCOSE BLD GHB EST-MCNC: 140 MG/DL
GLOBULIN SER CALC-MCNC: 3.5 G/DL (ref 2–4)
GLUCOSE SERPL-MCNC: 117 MG/DL (ref 74–99)
HBA1C MFR BLD: 6.5 % (ref 4.2–5.6)
HCT VFR BLD AUTO: 45.4 % (ref 36–48)
HGB BLD-MCNC: 14.8 G/DL (ref 13–16)
LYMPHOCYTES # BLD: 2 K/UL (ref 0.9–3.6)
LYMPHOCYTES NFR BLD: 25 % (ref 21–52)
MCH RBC QN AUTO: 26.9 PG (ref 24–34)
MCHC RBC AUTO-ENTMCNC: 32.6 G/DL (ref 31–37)
MCV RBC AUTO: 82.5 FL (ref 74–97)
MONOCYTES # BLD: 0.5 K/UL (ref 0.05–1.2)
MONOCYTES NFR BLD: 7 % (ref 3–10)
NEUTS SEG # BLD: 4.8 K/UL (ref 1.8–8)
NEUTS SEG NFR BLD: 61 % (ref 40–73)
PLATELET # BLD AUTO: 288 K/UL (ref 135–420)
PMV BLD AUTO: 11.9 FL (ref 9.2–11.8)
POTASSIUM SERPL-SCNC: 4.3 MMOL/L (ref 3.5–5.5)
PROT SERPL-MCNC: 7.6 G/DL (ref 6.4–8.2)
RBC # BLD AUTO: 5.5 M/UL (ref 4.7–5.5)
SODIUM SERPL-SCNC: 141 MMOL/L (ref 136–145)
WBC # BLD AUTO: 7.9 K/UL (ref 4.6–13.2)

## 2019-04-18 PROCEDURE — 80048 BASIC METABOLIC PNL TOTAL CA: CPT

## 2019-04-18 PROCEDURE — 36415 COLL VENOUS BLD VENIPUNCTURE: CPT

## 2019-04-18 PROCEDURE — 80076 HEPATIC FUNCTION PANEL: CPT

## 2019-04-18 PROCEDURE — 83036 HEMOGLOBIN GLYCOSYLATED A1C: CPT

## 2019-04-18 PROCEDURE — 85025 COMPLETE CBC W/AUTO DIFF WBC: CPT

## 2019-04-18 NOTE — PROGRESS NOTES
1. Have you been to the ER, urgent care clinic since your last visit? Hospitalized since your last visit? No    2. Have you seen or consulted any other health care providers outside of the [address] [place] [address] since your last visit? Include any pap smears or colon screening.  No

## 2019-04-18 NOTE — PROGRESS NOTES
[address] [place] [address], [place], Johan gutiérrez, Lizzie Colon, [place]       Delmar Mitchell, CATIA Brewster, RAPHAEL Trevino, JOHNSON-CATIA Cobian NP [address] [facility] 136    at [address], [address] [address]  [address].    [phone]    FAX: [address] [place] [address], [address]    [phone]    FAX: [phone]     Patient Care Team:  None as PCP - General      Problem List  Date Reviewed: 10/10/2018          Codes Class Noted    Fatty liver ICD-10-CM: K76.0  ICD-9-CM: 571.8  3/5/2018        Prediabetes ICD-10-CM: R73.03  ICD-9-CM: 790.29  3/5/2018    Overview Signed 3/5/2018 11:12 AM by Adarsh Cotto MD     HBA1C 6%                   Annabella Filiberto returns to the [facility] for management of presumed non-alcoholic fatty liver (NAFL). The active problem list, all pertinent past medical history, medications, liver histology, radiologic findings, and laboratory findings related to the liver disorder were reviewed with the patient. The patient is a 29 y.o. Black male who was first noted to have abnormalities in liver enzymes in 9/2017. Serologic evaluation for markers of chronic liver disease were negative. Ultrasound of the liver was performed in 10/2017. The results of the imaging suggested fatty liver disease. Assessment of liver fibrosis was performed with sheer wave elastography at THE [facility] in 4/2019. The result was 3.4 kPa which correlates with no fibrosis. The patient had not started any new medications within 3 months preceeding the elevation in liver chemistries.     The most recent laboratory studies indicate that the liver transaminases are normal, alkaline phosphatase is normal, tests of hepatic synthetic and metabolic function are normal, and the platelet count is normal.      The patient has no symptoms which could be attributed to the liver disorder. The patient has not experienced fatigue, pain in the right side over the liver. The patient completes all daily activities without any functional limitations. ALLERGIES  No Known Allergies    MEDICATIONS  No current outpatient medications on file. No current facility-administered medications for this visit. SYSTEM REVIEW NOT RELATED TO LIVER DISEASE OR REVIEWED ABOVE:  Constitution systems: 20 pound weight loss from diet and exercise. Eyes: Negative for visual changes. ENT: Negative for sore throat, painful swallowing. Respiratory: Negative for cough, hemoptysis, SOB. Cardiology: Negative for chest pain, palpitations. GI:  Negative for constipation or diarrhea. : Negative for urinary frequency, dysuria, hematuria, nocturia. Skin: Negative for rash. Hematology: Negative for easy bruising, blood clots. Musculo-skelatal: Negative for back pain, muscle pain, weakness. Neurologic: Negative for headaches, dizziness, vertigo, memory problems not related to HE. Psychology: Negative for anxiety, depression. FAMILY HISTORY:  The father is alive and healthy. The mother has the following chronic diseases: HTN, DM. There is no family history of liver disease. SOCIAL HISTORY:  The patient has never been . The patient has no children. The patient has never used tobacco products. The patient has never consumed significant amounts of alcohol. 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 PHYSICAL EXAMINATION:  Visit Vitals  /80 (BP 1 Location: Left arm, BP Patient Position: Sitting)   Pulse (!) 106   Temp 98.5 °F (36.9 °C)   Ht 6' (1.829 m)   Wt 254 lb (115.2 kg)   SpO2 97%   BMI 34.45 kg/m²     General: No acute distress. Eyes: Sclera anicteric. ENT: No oral lesions. Thyroid normal.  Nodes: No adenopathy. Skin: No spider angiomata. No jaundice. No palmar erythema. Respiratory: Lungs clear to auscultation. Cardiovascular: Regular heart rate. No murmurs. No JVD. Abdomen: Soft non-tender. Liver size normal to percussion/palpation. Spleen not palpable. No obvious ascites. Extremities: No edema. No muscle wasting. No gross arthritic changes. Neurologic: Alert and oriented. Cranial nerves grossly intact. No asterixis. LABORATORY STUDIES:  Liver [place] of [address] Units 10/10/2018 3/5/2018   WBC 4.6 - 13.2 K/uL 6.1 7.9   ANC 1.8 - 8.0 K/UL 3.5 4.8   HGB 13.0 - 16.0 g/dL 15.1 14.6    - 420 K/uL 287 291   INR 0.8 - 1.2    1.0   AST 15 - 37 U/L 30 104 (H)   ALT 16 - 61 U/L 58 47   Alk Phos 45 - 117 U/L 44 (L) 47   Bili, Total 0.2 - 1.0 MG/DL 0.5 0.6   Bili, Direct 0.0 - 0.2 MG/DL 0.1 0.1   Albumin 3.4 - 5.0 g/dL 4.1 4.1   BUN 7.0 - 18 MG/DL 8 9   Creat 0.6 - 1.3 MG/DL 0.95 0.92   Na 136 - 145 mmol/L 139 143   K 3.5 - 5.5 mmol/L 4.9 4.3   Cl 100 - 108 mmol/L 102 106   CO2 21 - 32 mmol/L 28 29   Glucose 74 - 99 mg/dL 82 92     SEROLOGIES:  Serologies Latest Ref Rng & Units 3/5/2018   Hep A Ab, Total NEGATIVE   Positive (A)   Hep B Surface Ag <1.00 Index <0.10   Hep B Surface Ag Interp NEG   NEGATIVE   Hep B Core Ab, Total NEGATIVE   NEGATIVE   Hep B Surface Ab >10.0 mIU/mL >1000.00   Hep B Surface Ab Interp POS   POSITIVE   Hep C Ab 0.0 - 0.9 s/co ratio <0.1   Ferritin 8 - 388 NG/   Iron % Saturation % 23   DEJAN, IFA  NEGATIVE   ASMCA 0 - 19 Units 16   Ceruloplasmin 16.0 - 31.0 mg/dL 21.7   Alpha-1 antitrypsin level 90 - 200 mg/dL 97     LIVER HISTOLOGY:  3/2018. Sheer wave elastography performed at THE [facility]. EkPa was E Range: 2.2-4.6 kPa, E Mean: 3.6 kPa, E Median: 3.6 kPa, E Std: 0.7 kPa. The results suggested a fibrosis level of F0.  4/2019. Sheer wave elastography performed at THE [facility].  EkPa was E Range: 2.81-4.08 3. KPa, E Mean: 3.42 kPa, E Median: 3.56 kPa, E Std: 0.43 kPa. The results suggested a fibrosis level of F0. ENDOSCOPIC PROCEDURES:  Not available or performed    RADIOLOGY:  10/2017. Ultrasound of liver. Echogenic consistent with fatty liver. No liver mass lesions. No dilated bile ducts. No ascites. 3/2018. Ultrasound of liver. Echogenic consistent with fatty liver. No liver mass lesions. No dilated bile ducts. No ascites. 4/2019. Ultrasound of liver. Echogenic consistent with fatty liver. No liver mass lesions. No dilated bile ducts. No ascites. OTHER TESTING:  Not available or performed    ASSESSMENT AND PLAN:  Elevation in liver transaminases of unclear etiology at this time. Suspected fatty liver disease with no fibrosis. AST is normal, ALT is normal, Alkaline phosphate is normal. Liver function is normal. The platelet count is normal.     Serologic testing to help define the cause of the laboratory abnormality were negative. Based upon laboratory studies and imaging the patient does not appear to have advanced liver disease. The benign form of fatty liver disease, NAFL (nonalcoholic fatty liver), is not thought to progress to fibrosis or cirrhosis. If the patient loses weight all steatosis will resolve. However, if the patient gains weight he could evolve into HAHN, develop liver injury and be at risk to develop cirrhosis. The patient has no overt features of metabolic syndrome and may therefore not have fatty liver. If he does it may be benign NAFL. There are no contraindications for the patient to take any medications that are necessary for treatment of other medical issues. Currently the patient is not taking any medications. The patient was counseled regarding alcohol consumption. Vaccination for viral hepatitis A and B is not required. The patient has serologic evidence of prior exposure or vaccination with immunity.     FOLLOW-UP:  All of the issues listed above in the Assessment and Plan were discussed with the patient. All questions were answered. The patient expressed a clear understanding of the above. No follow-up at [address] is needed. I would be glad to see the patient back for follow-up at any time in the future if the clinical situation changes.       VANESSA Talbot-BC  [address] Liver [place] of [address], [facility] [address]  [address] [phone]